# Patient Record
Sex: MALE
[De-identification: names, ages, dates, MRNs, and addresses within clinical notes are randomized per-mention and may not be internally consistent; named-entity substitution may affect disease eponyms.]

---

## 2023-02-15 PROBLEM — Z00.00 ENCOUNTER FOR PREVENTIVE HEALTH EXAMINATION: Status: ACTIVE | Noted: 2023-02-15

## 2023-02-23 ENCOUNTER — APPOINTMENT (OUTPATIENT)
Dept: UROLOGY | Facility: CLINIC | Age: 23
End: 2023-02-23

## 2023-04-05 ENCOUNTER — APPOINTMENT (OUTPATIENT)
Dept: UROLOGY | Facility: CLINIC | Age: 23
End: 2023-04-05

## 2023-04-05 ENCOUNTER — APPOINTMENT (OUTPATIENT)
Dept: DERMATOLOGY | Facility: CLINIC | Age: 23
End: 2023-04-05
Payer: COMMERCIAL

## 2023-04-05 PROCEDURE — 99203 OFFICE O/P NEW LOW 30 MIN: CPT

## 2023-04-14 ENCOUNTER — EMERGENCY (EMERGENCY)
Facility: HOSPITAL | Age: 23
LOS: 1 days | Discharge: HOME | End: 2023-04-14
Attending: EMERGENCY MEDICINE
Payer: COMMERCIAL

## 2023-04-14 VITALS
TEMPERATURE: 99 F | RESPIRATION RATE: 18 BRPM | SYSTOLIC BLOOD PRESSURE: 134 MMHG | DIASTOLIC BLOOD PRESSURE: 86 MMHG | OXYGEN SATURATION: 97 % | WEIGHT: 136.91 LBS | HEIGHT: 64 IN | HEART RATE: 62 BPM

## 2023-04-14 PROCEDURE — 73564 X-RAY EXAM KNEE 4 OR MORE: CPT | Mod: 26,LT

## 2023-04-14 PROCEDURE — 90715 TDAP VACCINE 7 YRS/> IM: CPT

## 2023-04-14 PROCEDURE — 73564 X-RAY EXAM KNEE 4 OR MORE: CPT

## 2023-04-14 PROCEDURE — 12041 INTMD RPR N-HF/GENIT 2.5CM/<: CPT

## 2023-04-14 PROCEDURE — 90471 IMMUNIZATION ADMIN: CPT

## 2023-04-14 PROCEDURE — 99283 EMERGENCY DEPT VISIT LOW MDM: CPT | Mod: 25

## 2023-04-14 PROCEDURE — 99284 EMERGENCY DEPT VISIT MOD MDM: CPT | Mod: 25

## 2023-04-14 PROCEDURE — 12001 RPR S/N/AX/GEN/TRNK 2.5CM/<: CPT | Mod: RT

## 2023-04-14 RX ORDER — IBUPROFEN 200 MG
800 TABLET ORAL ONCE
Refills: 0 | Status: COMPLETED | OUTPATIENT
Start: 2023-04-14 | End: 2023-04-14

## 2023-04-14 RX ORDER — CEPHALEXIN 500 MG
1 CAPSULE ORAL
Qty: 20 | Refills: 0
Start: 2023-04-14 | End: 2023-04-18

## 2023-04-14 RX ORDER — TETANUS TOXOID, REDUCED DIPHTHERIA TOXOID AND ACELLULAR PERTUSSIS VACCINE, ADSORBED 5; 2.5; 8; 8; 2.5 [IU]/.5ML; [IU]/.5ML; UG/.5ML; UG/.5ML; UG/.5ML
0.5 SUSPENSION INTRAMUSCULAR ONCE
Refills: 0 | Status: COMPLETED | OUTPATIENT
Start: 2023-04-14 | End: 2023-04-14

## 2023-04-14 RX ADMIN — TETANUS TOXOID, REDUCED DIPHTHERIA TOXOID AND ACELLULAR PERTUSSIS VACCINE, ADSORBED 0.5 MILLILITER(S): 5; 2.5; 8; 8; 2.5 SUSPENSION INTRAMUSCULAR at 21:48

## 2023-04-14 RX ADMIN — Medication 800 MILLIGRAM(S): at 21:47

## 2023-04-14 NOTE — ED PROVIDER NOTE - OBJECTIVE STATEMENT
22-year-old male presents to ED status post fall.  Patient explained that while skating he fell off a skateboard landing on his right knee and hand.  Patient admits to bracing his fall with his hand.  Patient admits to knee pain, and hand pain.  Patient denies any head trauma, neck trauma or chest trauma.  Patient denies any loss of consciousness, visual changes, shortness of breath or abdominal pain.  Patient admits to a cut to his knee and his hand.  Patient denies any significant past medical history, allergies medication or current medication.  Patient denies any other issue at this time

## 2023-04-14 NOTE — ED PROVIDER NOTE - ATTENDING APP SHARED VISIT CONTRIBUTION OF CARE
I, Kar Amezquita, performed a face to face bedside interview with this patient regarding history of present illness, review of symptoms and relevant past medical, social and family history.  I completed an independent physical examination. I have communicated the patient’s plan of care and disposition with the ACP.  22 year old presents s/p fall off skateboard onto R knee  Gen: NAD, well appearing  CV: RRR  Pul: CTA b/l  Neuro: no focal deficits  msk: FROM, non-tender to bony prominences, laceration to the ant R knee  Pt improved, no sign of traumatic injury, stable for dc

## 2023-04-14 NOTE — ED PROVIDER NOTE - CLINICAL SUMMARY MEDICAL DECISION MAKING FREE TEXT BOX
22-year-old male presents to ED status post fall.  Patient explained that while skating he fell off a skateboard landing on his right knee and hand.  Patient admits to bracing his fall with his hand.  Patient admits to knee pain, and hand pain.  Patient denies any head trauma, neck trauma or chest trauma.  Patient denies any loss of consciousness, visual changes, shortness of breath or abdominal pain.  Patient admits to a cut to his knee and his hand. HEENT: Normal findings, Eyes : PERRLA, EOMI , Nore isak and Throat : WNL  Lungs: Clear B/L with good air entry  CVS: S1-S2 , with no murmurs  Abd : Normal BS, with no tenderness or organomegaly  Ext: + Abrasion and laceration to right knee and Abrasion to right hand.   Abrasion cleaned and sutured. TD given in ED.  [facility].

## 2023-04-14 NOTE — ED PROVIDER NOTE - CARE PLAN
Principal Discharge DX:	Fall  Secondary Diagnosis:	Laceration of knee, right  Secondary Diagnosis:	Abrasion of right hand   1

## 2023-04-14 NOTE — ED PROVIDER NOTE - NS ED ATTENDING STATEMENT MOD
This was a shared visit with the [facility]. I reviewed and verified the documentation and independently performed the documented:

## 2023-04-14 NOTE — ED PROVIDER NOTE - PATIENT PORTAL LINK FT
You can access the FollowMyHealth Patient Portal offered by [facility] by registering at the following website: [email or web address]. By joining [facility]’s FollowMyHealth portal, you will also be able to view your health information using other applications (apps) compatible with our system.

## 2023-04-14 NOTE — ED PROVIDER NOTE - PROGRESS NOTE DETAILS
Abrasion to the hand and knee cleaned with copious amount of normal saline, Betadine.  1% lidocaine infused in ED and dissolvable suture used to close laceration within the abrasion.  Patient tolerated procedure well dressing applied patient DC in stable condition Rx Keflex at the patient pharmacy.  Follow-up with

## 2023-04-14 NOTE — ED ADULT TRIAGE NOTE - CHIEF COMPLAINT QUOTE
Patient presents to ED s/p accident on skateboard injuring his left knee and b/l shoulders.  Left knee abrasion with open, deep area approximately quarter sized, bleeding controlled.  Denies head injury.

## 2023-04-14 NOTE — ED PROVIDER NOTE - NSFOLLOWUPINSTRUCTIONS_ED_ALL_ED_FT
Continue with medication as prescribed   Keep dressing on x 2-3 days   Tap knee to dry   F/U with Primary care Physician as discussed

## 2023-05-10 ENCOUNTER — APPOINTMENT (OUTPATIENT)
Dept: DERMATOLOGY | Facility: CLINIC | Age: 23
End: 2023-05-10

## 2025-05-30 NOTE — ED PROCEDURE NOTE - NS ED PROCEDURE ASSISTED BY
Care Transitions Note:  Writer called [facility] and was informed that patient is still there for rehab.   The procedure was performed independently